# Patient Record
(demographics unavailable — no encounter records)

---

## 2024-11-07 NOTE — ASSESSMENT
[FreeTextEntry1] : 1 knee pain obtain xrays  test for inflammatory  proteins  Given the current findings for the patient, I recommend proceeding with advanced imaging to better characterize and diagnose the problem to aid patient care, management and treatment guidance as I suspect an internal injury to the knee not diagnosed on non-diagnostic plain radiographs causing the patients symptoms and physical examination to help provide surgical management planning.  Therefore given the patients continued symptoms despite [ non-operative management ] with continued pain affecting ADLs and inability to do activities limiting quality of life as well as locking and instability significant for patient falls potentially placing the patient at increased risk for exacerbating the injury or causing further injury to the knee, an examination and history consistent with injury to an internal knee derangement and a non-diagnostic radiograph I recommend obtaining an MRI to assess the knee's internal structures for preoperative planning. I discussed with the patient that I am ordering an MRI to assess the soft tissue structures in the joint such as ligaments and tendons, as well as to assess the cartilage and menisci as well as for any bony edema. The test will need insurance approval and will take place at a [facility] or outside facility. If the patient elects to obtain the MRI from an outside facility, the patient understands they have been instructed to bring in both the final radiologist read and a CD/DVD with the MRI images to allow review of the imaging test by myself during the follow up visit. I do not recommend obtaining an open standing MRI as the quality of the images is subpar and makes it difficult to diagnose intra-articular derangements and guide care discussion and decision making. The patient was prescribed Diclofenac PO non-steroidal anti-inflammatory medication. 50mg tablets twice daily to be taken for at least 1-2 weeks in a row and then PRN afterwards. Risks and benefits were discussed and include but not limited to renal damage and GI ulceration and bleeding. They were advised to take with food to limit stomach upset as well as warned to stop the medication if worsening gastric pain or dizziness or other side effects. Also to immediately stop the medication and seek appropriate medical attention if any severe stomach ache, gastritis, black/red vomit, black/red stools or any other medical concern.  The patient verifies their understanding the the visit, diagnosis and plan. They agree with the treatment plan and will contact the office with any questions or problems.  2. hld  to lower  LDL and non HDL  cholesterol levels     1 limit your intake of foods full of saturated fats  , trans fats, and dietary cholesterol .  Food with a lot of saturated fate include butter, fatty flesh like red meat, full fat and low fat dairy  products  , palm oil and coconut oil .   If you see partially hydrogenated fat in the ingredient  list of food label that food has trans fats.  Top sources of dietary chol include egg yolks , organ meats and shell fish.  one Type of fat omega 3 Fatty acids has been shown to protect against heart disease  . Good sources are cold water fish like salmon, mackerel , halibut ., trout  herring and sardines.     Limit  your intake of meat , poultry and fish to no more than 3.5  ounces per day     Eat a lot more fiber rich foods  like beans , oats, barley fruits and vegetables   .  Food naturally rich in soluble fiber  are good at lowering cholesterol .    Excellent choices  are  oats , oat bran , barley , peas , yams sweet potatoes and legumes  or beans .  Good fruit sources are berries passion fruit, oranges pears,, apricots , apples  and nectarines  .    choose protein rich plant foods   such as legumes or beans nuts and seeds over meat.     Lose as much weight as possible and exercise   Take plant sterol supplements   .A Daily intake  of 1-2 gms  of plant sterols  lowers ldl .    Best choice is supplements  such as Cholestoff  by natures made   because they dont have calories  sugar trans fats   an salt  of many foods enriched with plant sterols.    Take  Metamucil or psyllium   .   Studies have shown 9-10 gms as daily of psyllium   the equivalent of  about  3 teaspoons  of sugar free Metamucil   reduced LDL levels  . 3. bmi 27  Weight loss, exercise, and diet control were discussed and are highly encouraged. Treatment options were given such as, aqua therapy, and contacting a nutritionist. Recommended to use the elliptical, stationary bike, less use of treadmill. Mindful eating was explained to the patient Obesity is associated with worsening asthma, shortness of breath, and potential for cardiac disease, diabetes, and other underlying medical conditions.   -Nutrition and lifestyle concepts reviewed in detail. Recommending an unprocessed diet with >= 50% of nutrients from whole plant sources; most food early in the day; most calories before 4 pm, and no food after 8 pm; advised starting with small sustainable changes and building up over time. The long-term plan for this type of dietary change was reviewed. A number of resources to help in initiating these changes were provided. -A particular emphasis was placed on the need to remove processed foods from the diet. The concept of insulin resistance was introduced as important for understanding effective weight loss measures. Educational handouts explaining these concepts were provided. 4. iron def  repat if still deficient  egd and c olonosocpy will be ordered  5. arthralgia  in several joints ankles knees   await testing of knees  and labs   6. fatty liver  Fatty Liver: The patient denies any jaundice or pruritus. The patient denies any alcohol use. The patient denies taking large doses of nonsteroidal anti-inflammatory drugs or acetaminophen. The findings are suggestive of fatty liver. The patient and I had a long discussion regarding the risks of fatty liver progressing to cirrhosis. The patient was told of the possible increased risk of developing liver failure, cirrhosis, ascites, GI bleeding secondary to varices, hepatic encephalopathy, bleeding tendencies and liver cancer. The patient was told of the importance of follow-up. The patient was advised to follow up every 6 months for blood work and imaging studies. The patient agreed and will follow up. The patient was advised to lose weight. I recommend a trial of vitamin E supplementation for the fatty liver. If the liver enzymes remain elevated, the patient may require a trial of Pioglitazone for the fatty liver. I recommend avoid alcohol and hepato-toxic agents. The patient was also advised to avoid NSAIDs, Acetaminophen and any other hepatotoxic drugs. The patient was also advised not to share needles, razors, scissors, nail clippers, etc.. The patient is to continue close follow-up in our office for blood work and exams. If the liver enzymes remain elevated, the patient may require a CT guided liver biopsy to assess the liver parenchyma and for possible treatment. We had a long discussion regarding the risks and benefits of the procedure. The patient was told of the risks of bleeding, perforation, infections, emergency surgery and missing lesions. The patient agreed and will follow-up to reassess the symptoms Patient has been counseled on life style interventions, including but not limited to: weight loss (3-5% loss of body weight might improve fat in the liver, while 7-10% needed for potential improvement of other components, including inflammation and scarring of the liver, called fibrosis); healthy diet, avoiding added sugars, sodas, avoiding saturated fats, limiting sodium, avoiding alcohol; and on the importance of regular exercise (> 150 min/week moderate intensity aerobic exercise with at least 2x/week muscle strengthening or exercise as tolerated).  - I explained to patient the natural Hx of NAFLD.  7. ovarian cyst  right unilocular  EXAM: [id number] - US PELVIC COMPLETE  - ORDERED BY: IVANNA WESTON  EXAM: [id number] - US TRANSVAGINAL  - ORDERED BY: IVANNA WESTON   PROCEDURE DATE:  07/24/2024    INTERPRETATION:  CLINICAL INFORMATION: Right ovarian cyst  COMPARISON: 6/3/2020  TECHNIQUE: Endovaginal and transabdominal pelvic sonogram. Color and Spectral Doppler was performed.  FINDINGS: Uterus: Status post hysterectomy.  Right ovary: 2.3 cm x 1.5 cm x 1.4 cm. 1.1 cm unilocular cyst. Left ovary: Nonvisualized.  Fluid: None.  IMPRESSION: 1.1 cm unilocular right ovarian cyst.  Status post hysterectomy.  Left ovary nonvisualized.  --- End of Report --- fu us

## 2024-11-07 NOTE — HISTORY OF PRESENT ILLNESS
[FreeTextEntry1] : fu  [de-identified] : Pt is a 52 yr old woman with hld, hematuria, celiac disease asthma and iron def who is here for fu. Pt has iron def  and sp hemorrhoidectomy  She no longer has menstruation for 7 yrs. and had hysterectomy  . She had colonoscopy in 2020 diverticulosis . She was having rectal bleeding for her hemorrhoids  and was bleeding daily. She is not taking iron   She has discomfort in  right side of abdomen and left   at times.   She doesnt have reflux of acid ,nausea or vomiting or black stools or rectal bleeding.   She states when she wakes up in am she has pain in her knees and if she has cold air  on her knees she has pain and has pain climbing up and down stairs. no swelling of knees  and has pain with excessive walking.  the pain level 6/10 and takes neurobiome and it helps the pain.

## 2024-11-07 NOTE — PHYSICAL EXAM
[No Acute Distress] : no acute distress [Normal Oropharynx] : the oropharynx was normal [No JVD] : no jugular venous distention [Supple] : supple [Soft, Nontender] : the abdomen was soft and nontender [No Mass] : no masses were palpated [No HSM] : no hepatosplenomegaly noted [Normal Posterior Cervical Nodes] : no posterior cervical lymphadenopathy [Normal Anterior Cervical Nodes] : no anterior cervical lymphadenopathy [Normal Station and Gait] : the gait and station were normal [Normal Motor Tone] : the muscle tone was normal [Involuntary Movements] : no involuntary movements were seen [Normal] : affect was normal and insight and judgment were intact [de-identified] : abdominoplasty

## 2024-11-07 NOTE — HEALTH RISK ASSESSMENT
[No] : In the past 12 months have you used drugs other than those required for medical reasons? No [No falls in past year] : Patient reported no falls in the past year [Little interest or pleasure doing things] : 1) Little interest or pleasure doing things [Feeling down, depressed, or hopeless] : 2) Feeling down, depressed, or hopeless [0] : 2) Feeling down, depressed, or hopeless: Not at all (0) [PHQ-2 Negative - No further assessment needed] : PHQ-2 Negative - No further assessment needed [Never] : Never [de-identified] : none  [de-identified] : reg [[id number]] : 0 [AdvancecareDate] : 11/7/24

## 2025-07-14 NOTE — ASSESSMENT
[FreeTextEntry1] : Impression: right breast pain- benign breast exam today- she needs imaging  pain could be due to the hormonal changes due to the   menopause

## 2025-07-14 NOTE — HISTORY OF PRESENT ILLNESS
[de-identified] : Patient is a 53 year -old female  who was referred by Dr. Ton Norris with the chief complaint of having a   right    breast pain for 6 months .  She  denies any trauma to the breast, denies  skin changes  and She has no spontaneous nipple discharge. She  denies any fever, night sweats or loss of appetite.    There is no family history of breast carcinoma.   Menarche at age 12, -4, para-3.   Date of  her last menstrual period: hysterectomy age 43 ( cysts) .  Ovaries were not removed.  Patient is on no hormonal replacement therapy. she reports history of breast implants  and removal in .   Patient  had a BL breast US and a  mammogram on 2024,   that was deemed a BIRADS 2.

## 2025-07-14 NOTE — PHYSICAL EXAM
[Alert] : alert [Oriented to Person] : oriented to person [Oriented to Place] : oriented to place [Oriented to Time] : oriented to time [Calm] : calm [de-identified] : She  is alert, well-groomed, and in NAD   [de-identified] : anicteric.  Nasal mucosa pink, septum midline. Oral mucosa pink.  Tongue midline, Pharynx without exudates.   [de-identified] : BL breast mammoplasty scars.    No palpable nodules, masses, tenderness, or axillary or supraclavicular adenopathy. No nipple discharge. no skin retraction

## 2025-07-14 NOTE — PLAN
[FreeTextEntry1] : Ms. WOLFE  is presenting  today for an evaluation .  she is doing well and offers no complaints.  Results of  her last   imaging and physical examination findings were discussed in details.  Significance of the findings were discussed.    She  was advised to have  BL    breast US and Mammogram   and return after the tests. Importance of monthly self-breast examination was reinforced.  Patient's questions and concerns addressed to patient's satisfaction.      Vitamin E daily for breast pain.  Avoid caffein and Chocolates to prevent breast pain NSAIDs PRN for pain